# Patient Record
Sex: MALE | Race: WHITE | NOT HISPANIC OR LATINO | Employment: UNEMPLOYED | URBAN - METROPOLITAN AREA
[De-identification: names, ages, dates, MRNs, and addresses within clinical notes are randomized per-mention and may not be internally consistent; named-entity substitution may affect disease eponyms.]

---

## 2017-01-20 ENCOUNTER — TELEPHONE (OUTPATIENT)
Dept: INTERNAL MEDICINE | Facility: CLINIC | Age: 37
End: 2017-01-20

## 2017-01-20 NOTE — TELEPHONE ENCOUNTER
----- Message from Ny Weller sent at 1/20/2017  1:05 PM EST -----  Contact: [phone]  INA MEEHANRAND CALLING FOR HER  DALE HERRERA WHO WENT TO [facility] ER YESTERDAY AND WAS DIAGNOSED WITH ACUTE PNA. HE WAS GIVEN A ZPAK AND HAS STARTED VOMITING TODAY. SHE SAID ITS YELLOW AND LOOKS LIKE INFECTION. SHE WANTS TO KNOW IF THIS IS NORMAL OR IF SHE NEEDS TO HAVE HIM SEEN. SHE CAN BE REACHED [phone]

## 2017-01-20 NOTE — TELEPHONE ENCOUNTER
LVM with Marisol and informed her that pt will need to schedule an appt.and that she will need to call and schedule this soon. Office # given. LVM on pt's # and informed him that he needs to contact the office and make an appt. Office # also given to him.

## 2017-02-06 ENCOUNTER — TELEPHONE (OUTPATIENT)
Dept: INTERNAL MEDICINE | Facility: CLINIC | Age: 37
End: 2017-02-06

## 2017-02-06 RX ORDER — LISINOPRIL 5 MG/1
5 TABLET ORAL DAILY
Qty: 30 TABLET | Refills: 0 | Status: SHIPPED | OUTPATIENT
Start: 2017-02-06 | End: 2017-03-21 | Stop reason: SDUPTHER

## 2017-02-06 RX ORDER — LISINOPRIL 5 MG/1
TABLET ORAL
Refills: 0 | COMMUNITY
Start: 2016-12-16 | End: 2017-02-06 | Stop reason: SDUPTHER

## 2017-02-06 NOTE — TELEPHONE ENCOUNTER
----- Message from Margie Apodaca sent at 2/6/2017  9:07 AM EST -----  Contact: self  Pt called wanting to know is he could have refills on Rx lisinopril to hold him over until he comes into the office in March. Is this okay?  He can be reached at [phone]    Pharmacy- [facility]

## 2017-03-14 RX ORDER — LISINOPRIL 5 MG/1
TABLET ORAL
Qty: 30 TABLET | Refills: 0 | OUTPATIENT
Start: 2017-03-14

## 2017-03-17 RX ORDER — LISINOPRIL 5 MG/1
TABLET ORAL
Qty: 30 TABLET | Refills: 0 | OUTPATIENT
Start: 2017-03-17

## 2017-03-17 RX ORDER — LISINOPRIL 5 MG/1
5 TABLET ORAL DAILY
Qty: 30 TABLET | Refills: 0 | OUTPATIENT
Start: 2017-03-17

## 2017-03-21 ENCOUNTER — TELEPHONE (OUTPATIENT)
Dept: INTERNAL MEDICINE | Facility: CLINIC | Age: 37
End: 2017-03-21

## 2017-03-21 RX ORDER — LISINOPRIL 5 MG/1
5 TABLET ORAL DAILY
Qty: 30 TABLET | Refills: 0 | Status: SHIPPED | OUTPATIENT
Start: 2017-03-21 | End: 2017-04-10 | Stop reason: SDUPTHER

## 2017-03-21 NOTE — TELEPHONE ENCOUNTER
----- Message from yN Weller sent at 3/21/2017  1:56 PM EDT -----  Contact: INA-WIFE  INA HERRERA CALLING FOR HER  DALE HERRERA WHO IS NEEDING A REFILL FOR HIS LISINOPRIL BUT HE WAS TOLD IT WAS DENIED. HE TOOK HIS LAST PILL TODAY AND WANTS TO KNOW WHAT HE SHOULD DO. SHE CAN BE REACHED [phone]

## 2017-03-21 NOTE — TELEPHONE ENCOUNTER
Informed pt's wife that pt needed an appt to be able to receive more refills. She stated he has an appt on 3/23, I told her i would send in a months worth but to make sure he keeps his appt and be seen on that day.

## 2017-03-23 ENCOUNTER — OFFICE VISIT (OUTPATIENT)
Dept: INTERNAL MEDICINE | Facility: CLINIC | Age: 37
End: 2017-03-23

## 2017-03-23 VITALS
SYSTOLIC BLOOD PRESSURE: 108 MMHG | DIASTOLIC BLOOD PRESSURE: 72 MMHG | BODY MASS INDEX: 46.86 KG/M2 | TEMPERATURE: 98.6 F | HEART RATE: 84 BPM | RESPIRATION RATE: 16 BRPM | WEIGHT: 273 LBS

## 2017-03-23 DIAGNOSIS — M62.830 SPASM OF BACK MUSCLES: ICD-10-CM

## 2017-03-23 DIAGNOSIS — Z23 IMMUNIZATION DUE: ICD-10-CM

## 2017-03-23 DIAGNOSIS — F17.200 TOBACCO DEPENDENCE: ICD-10-CM

## 2017-03-23 DIAGNOSIS — M54.2 NECK PAIN: ICD-10-CM

## 2017-03-23 DIAGNOSIS — R53.83 FATIGUE, UNSPECIFIED TYPE: ICD-10-CM

## 2017-03-23 DIAGNOSIS — Z71.6 TOBACCO ABUSE COUNSELING: Primary | ICD-10-CM

## 2017-03-23 DIAGNOSIS — R31.9 HEMATURIA: ICD-10-CM

## 2017-03-23 DIAGNOSIS — E66.9 OBESITY, UNSPECIFIED OBESITY SEVERITY, UNSPECIFIED OBESITY TYPE: ICD-10-CM

## 2017-03-23 DIAGNOSIS — I10 ESSENTIAL HYPERTENSION: ICD-10-CM

## 2017-03-23 LAB
ALBUMIN SERPL-MCNC: 4.8 G/DL (ref 3.2–4.8)
ALBUMIN/GLOB SERPL: 1.3 G/DL (ref 1.5–2.5)
ALP SERPL-CCNC: 90 U/L (ref 25–100)
ALT SERPL W P-5'-P-CCNC: 38 U/L (ref 7–40)
ANION GAP SERPL CALCULATED.3IONS-SCNC: 4 MMOL/L (ref 3–11)
ARTICHOKE IGE QN: 136 MG/DL (ref 0–130)
AST SERPL-CCNC: 48 U/L (ref 0–33)
BACTERIA UR QL AUTO: ABNORMAL /HPF
BASOPHILS # BLD AUTO: 0.09 10*3/MM3 (ref 0–0.2)
BASOPHILS NFR BLD AUTO: 1 % (ref 0–1)
BILIRUB BLD-MCNC: NEGATIVE MG/DL
BILIRUB SERPL-MCNC: 0.6 MG/DL (ref 0.3–1.2)
BUN BLD-MCNC: 17 MG/DL (ref 9–23)
BUN/CREAT SERPL: 17 (ref 7–25)
CALCIUM SPEC-SCNC: 10.3 MG/DL (ref 8.7–10.4)
CHLORIDE SERPL-SCNC: 99 MMOL/L (ref 99–109)
CHOLEST SERPL-MCNC: 175 MG/DL (ref 0–200)
CLARITY, POC: CLEAR
CO2 SERPL-SCNC: 36 MMOL/L (ref 20–31)
COLOR UR: YELLOW
CREAT BLD-MCNC: 1 MG/DL (ref 0.6–1.3)
DEPRECATED RDW RBC AUTO: 46.8 FL (ref 37–54)
EOSINOPHIL # BLD AUTO: 0.02 10*3/MM3 (ref 0.1–0.3)
EOSINOPHIL NFR BLD AUTO: 0.2 % (ref 0–3)
ERYTHROCYTE [DISTWIDTH] IN BLOOD BY AUTOMATED COUNT: 14.5 % (ref 11.3–14.5)
EXPIRATION DATE: ABNORMAL
GFR SERPL CREATININE-BSD FRML MDRD: 84 ML/MIN/1.73
GLOBULIN UR ELPH-MCNC: 3.6 GM/DL
GLUCOSE BLD-MCNC: 96 MG/DL (ref 70–100)
GLUCOSE UR STRIP-MCNC: NEGATIVE MG/DL
HCT VFR BLD AUTO: 53.4 % (ref 38.9–50.9)
HDLC SERPL-MCNC: 26 MG/DL (ref 40–60)
HGB BLD-MCNC: 17.1 G/DL (ref 13.1–17.5)
HYALINE CASTS UR QL AUTO: ABNORMAL /LPF
IMM GRANULOCYTES # BLD: 0.02 10*3/MM3 (ref 0–0.03)
IMM GRANULOCYTES NFR BLD: 0.2 % (ref 0–0.6)
KETONES UR QL: NEGATIVE
LEUKOCYTE EST, POC: NEGATIVE
LYMPHOCYTES # BLD AUTO: 3.01 10*3/MM3 (ref 0.6–4.8)
LYMPHOCYTES NFR BLD AUTO: 33.4 % (ref 24–44)
Lab: ABNORMAL
MCH RBC QN AUTO: 28.3 PG (ref 27–31)
MCHC RBC AUTO-ENTMCNC: 32 G/DL (ref 32–36)
MCV RBC AUTO: 88.4 FL (ref 80–99)
MONOCYTES # BLD AUTO: 1.7 10*3/MM3 (ref 0–1)
MONOCYTES NFR BLD AUTO: 18.8 % (ref 0–12)
NEUTROPHILS # BLD AUTO: 4.18 10*3/MM3 (ref 1.5–8.3)
NEUTROPHILS NFR BLD AUTO: 46.4 % (ref 41–71)
NITRITE UR-MCNC: NEGATIVE MG/ML
PH UR: 6 [PH] (ref 5–8)
PLATELET # BLD AUTO: 189 10*3/MM3 (ref 150–450)
PMV BLD AUTO: 10.2 FL (ref 6–12)
POTASSIUM BLD-SCNC: 4.7 MMOL/L (ref 3.5–5.5)
PROT SERPL-MCNC: 8.4 G/DL (ref 5.7–8.2)
PROT UR STRIP-MCNC: ABNORMAL MG/DL
RBC # BLD AUTO: 6.04 10*6/MM3 (ref 4.2–5.76)
RBC # UR STRIP: ABNORMAL /UL
RBC # UR: ABNORMAL /HPF
REF LAB TEST METHOD: ABNORMAL
SODIUM BLD-SCNC: 139 MMOL/L (ref 132–146)
SP GR UR: 1.01 (ref 1–1.03)
SQUAMOUS #/AREA URNS HPF: ABNORMAL /HPF
TRIGL SERPL-MCNC: 155 MG/DL (ref 0–150)
TSH SERPL DL<=0.05 MIU/L-ACNC: 1.52 MIU/ML (ref 0.35–5.35)
UROBILINOGEN UR QL: NORMAL
WBC NRBC COR # BLD: 9.02 10*3/MM3 (ref 3.5–10.8)
WBC UR QL AUTO: ABNORMAL /HPF

## 2017-03-23 PROCEDURE — 81003 URINALYSIS AUTO W/O SCOPE: CPT | Performed by: NURSE PRACTITIONER

## 2017-03-23 PROCEDURE — 99406 BEHAV CHNG SMOKING 3-10 MIN: CPT | Performed by: NURSE PRACTITIONER

## 2017-03-23 PROCEDURE — 80050 GENERAL HEALTH PANEL: CPT | Performed by: NURSE PRACTITIONER

## 2017-03-23 PROCEDURE — 80061 LIPID PANEL: CPT | Performed by: NURSE PRACTITIONER

## 2017-03-23 PROCEDURE — 36415 COLL VENOUS BLD VENIPUNCTURE: CPT | Performed by: NURSE PRACTITIONER

## 2017-03-23 PROCEDURE — 99214 OFFICE O/P EST MOD 30 MIN: CPT | Performed by: NURSE PRACTITIONER

## 2017-03-23 PROCEDURE — 81015 MICROSCOPIC EXAM OF URINE: CPT | Performed by: NURSE PRACTITIONER

## 2017-03-23 RX ORDER — GABAPENTIN 300 MG/1
300 CAPSULE ORAL 3 TIMES DAILY
Refills: 0 | COMMUNITY
Start: 2017-03-13 | End: 2017-03-23 | Stop reason: SDUPTHER

## 2017-03-23 RX ORDER — GABAPENTIN 300 MG/1
600 CAPSULE ORAL 3 TIMES DAILY
Qty: 180 CAPSULE | Refills: 0 | Status: SHIPPED | OUTPATIENT
Start: 2017-03-23 | End: 2017-05-15 | Stop reason: SDUPTHER

## 2017-03-23 NOTE — PATIENT INSTRUCTIONS
Steps to Quit Smoking   Smoking tobacco can be harmful to your health and can affect almost every organ in your body. Smoking puts you, and those around you, at risk for developing many serious chronic diseases. Quitting smoking is difficult, but it is one of the best things that you can do for your health. It is never too late to quit.  WHAT ARE THE BENEFITS OF QUITTING SMOKING?  When you quit smoking, you lower your risk of developing serious diseases and conditions, such as:  · Lung cancer or lung disease, such as COPD.  · Heart disease.  · Stroke.  · Heart attack.  · Infertility.  · Osteoporosis and bone fractures.  Additionally, symptoms such as coughing, wheezing, and shortness of breath may get better when you quit. You may also find that you get sick less often because your body is stronger at fighting off colds and infections. If you are pregnant, quitting smoking can help to reduce your chances of having a baby of low birth weight.  HOW DO I GET READY TO QUIT?  When you decide to quit smoking, create a plan to make sure that you are successful. Before you quit:  · Pick a date to quit. Set a date within the next two weeks to give you time to prepare.  · Write down the reasons why you are quitting. Keep this list in places where you will see it often, such as on your bathroom mirror or in your car or wallet.  · Identify the people, places, things, and activities that make you want to smoke (triggers) and avoid them. Make sure to take these actions:    Throw away all cigarettes at home, at work, and in your car.    Throw away smoking accessories, such as ashtrays and lighters.    Clean your car and make sure to empty the ashtray.    Clean your home, including curtains and carpets.  · Tell your family, friends, and coworkers that you are quitting. Support from your loved ones can make quitting easier.  · Talk with your health care provider about your options for quitting smoking.  · Find out what treatment  "options are covered by your health insurance.  WHAT STRATEGIES CAN I USE TO QUIT SMOKING?   Talk with your healthcare provider about different strategies to quit smoking. Some strategies include:  · Quitting smoking altogether instead of gradually lessening how much you smoke over a period of time. Research shows that quitting \"cold turkey\" is more successful than gradually quitting.  · Attending in-person counseling to help you build problem-solving skills. You are more likely to have success in quitting if you attend several counseling sessions. Even short sessions of 10 minutes can be effective.  · Finding resources and support systems that can help you to quit smoking and remain smoke-free after you quit. These resources are most helpful when you use them often. They can include:    Online chats with a counselor.    Telephone quitlines.    Printed self-help materials.    Support groups or group counseling.    Text messaging programs.    Mobile phone applications.  · Taking medicines to help you quit smoking. (If you are pregnant or breastfeeding, talk with your health care provider first.) Some medicines contain nicotine and some do not. Both types of medicines help with cravings, but the medicines that include nicotine help to relieve withdrawal symptoms. Your health care provider may recommend:    Nicotine patches, gum, or lozenges.    Nicotine inhalers or sprays.    Non-nicotine medicine that is taken by mouth.  Talk with your health care provider about combining strategies, such as taking medicines while you are also receiving in-person counseling. Using these two strategies together makes you more likely to succeed in quitting than if you used either strategy on its own.  If you are pregnant or breastfeeding, talk with your health care provider about finding counseling or other support strategies to quit smoking. Do not take medicine to help you quit smoking unless told to do so by your health care " provider.  WHAT THINGS CAN I DO TO MAKE IT EASIER TO QUIT?  Quitting smoking might feel overwhelming at first, but there is a lot that you can do to make it easier. Take these important actions:  · Reach out to your family and friends and ask that they support and encourage you during this time. Call telephone quitlines, reach out to support groups, or work with a counselor for support.  · Ask people who smoke to avoid smoking around you.  · Avoid places that trigger you to smoke, such as bars, parties, or smoke-break areas at work.  · Spend time around people who do not smoke.  · Lessen stress in your life, because stress can be a smoking trigger for some people. To lessen stress, try:    Exercising regularly.    Deep-breathing exercises.    Yoga.    Meditating.    Performing a body scan. This involves closing your eyes, scanning your body from head to toe, and noticing which parts of your body are particularly tense. Purposefully relax the muscles in those areas.  · Download or purchase mobile phone or tablet apps (applications) that can help you stick to your quit plan by providing reminders, tips, and encouragement. There are many free apps, such as QuitGuide from the CDC (Centers for Disease Control and Prevention). You can find other support for quitting smoking (smoking cessation) through smokefree.gov and other websites.  HOW WILL I FEEL WHEN I QUIT SMOKING?  Within the first 24 hours of quitting smoking, you may start to feel some withdrawal symptoms. These symptoms are usually most noticeable 2-3 days after quitting, but they usually do not last beyond 2-3 weeks. Changes or symptoms that you might experience include:  · Mood swings.  · Restlessness, anxiety, or irritation.  · Difficulty concentrating.  · Dizziness.  · Strong cravings for sugary foods in addition to nicotine.  · Mild weight gain.  · Constipation.  · Nausea.  · Coughing or a sore throat.  · Changes in how your medicines work in your  body.  · A depressed mood.  · Difficulty sleeping (insomnia).  After the first 2-3 weeks of quitting, you may start to notice more positive results, such as:  · Improved sense of smell and taste.  · Decreased coughing and sore throat.  · Slower heart rate.  · Lower blood pressure.  · Clearer skin.  · The ability to breathe more easily.  · Fewer sick days.  Quitting smoking is very challenging for most people. Do not get discouraged if you are not successful the first time. Some people need to make many attempts to quit before they achieve long-term success. Do your best to stick to your quit plan, and talk with your health care provider if you have any questions or concerns.     This information is not intended to replace advice given to you by your health care provider. Make sure you discuss any questions you have with your health care provider.     Document Released: 12/12/2002 Document Revised: 05/03/2016 Document Reviewed: 05/03/2016  [employer] Interactive Patient Education ©2016 [employer].    MyPlate from [employer]  The general, healthful diet is based on the 2010 Dietary Guidelines for Americans. The amount of food you need to eat from each food group depends on your age, sex, and level of physical activity and can be individualized by a dietitian. Go to ChooseMyPlate.gov for more information.  WHAT DO I NEED TO KNOW ABOUT THE MYPLATE PLAN?  · Enjoy your food, but eat less.    · Avoid oversized portions.      ½ of your plate should include fruits and vegetables.    ¼ of your plate should be grains.    ¼ of your plate should be protein.  Grains  · Make at least half of your grains whole grains.  · For a 2,000 calorie daily food plan, eat 6 oz every day.  · 1 oz is about 1 slice bread, 1 cup cereal, or ½ cup cooked rice, cereal, or pasta.  Vegetables  · Make half your plate fruits and vegetables.  · For a 2,000 calorie daily food plan, eat 2½ cups every day.  · 1 cup is about 1 cup raw or cooked vegetables or  vegetable juice or 2 cups raw leafy greens.  Fruits  · Make half your plate fruits and vegetables.  · For a 2,000 calorie daily food plan, eat 2 cups every day.  · 1 cup is about 1 cup fruit or 100% fruit juice or ½ cup dried fruit.  Protein  · For a 2,000 calorie daily food plan, eat 5½ oz every day.  · 1 oz is about 1 oz meat, poultry, or fish, ¼ cup cooked beans, 1 egg, 1 Tbsp peanut butter, or ½ oz nuts or seeds.  Dairy  · Switch to fat-free or low-fat (1%) milk.  · For a 2,000 calorie daily food plan, eat 3 cups every day.  · 1 cup is about 1 cup milk or yogurt or soy milk (soy beverage), 1½ oz natural cheese, or 2 oz processed cheese.  Fats, Oils, and Empty Calories  · Only small amounts of oils are recommended.  · Empty calories are calories from solid fats or added sugars.  · Compare sodium in foods like soup, bread, and frozen meals. Choose the foods with lower numbers.  · Drink water instead of sugary drinks.  WHAT FOODS CAN I EAT?  Grains  Whole grains such as whole wheat, quinoa, millet, and bulgur. Bread, rolls, and pasta made from whole grains. Brown or wild rice. Hot or cold cereals made from whole grains and without added sugar.  Vegetables  All fresh vegetables, especially fresh red, dark green, or orange vegetables. Peas and beans. Low-sodium frozen or canned vegetables prepared without added salt. Low-sodium vegetable juices.  Fruits  All fresh, frozen, and dried fruits. Canned fruit packed in water or fruit juice without added sugar. Fruit juices without added sugar.  Meats and Other Protein Sources  Boiled, baked, or grilled lean meat trimmed of fat. Skinless poultry. Fresh seafood and shellfish. Canned seafood packed in water. Unsalted nuts and unsalted nut butters. Tofu. Dried beans and pea. Eggs.  Dairy  Low-fat or fat-free milk, yogurt, and cheeses.   Sweets and Desserts  Frozen desserts made from low-fat milk.  Fats and Oils  Olive, peanut, and canola oils and margarine. Salad dressing and  mayonnaise made from these oils.  Other  Soups and casseroles made from allowed ingredients and without added fat or salt.  The items listed above may not be a complete list of recommended foods or beverages. Contact your dietitian for more options.   WHAT FOODS ARE NOT RECOMMENDED?  Grains  Sweetened, low-fiber cereals. Packaged baked goods. Snack crackers and chips. Cheese crackers, butter crackers, and biscuits. Frozen waffles, sweet breads, doughnuts, pastries, packaged baking mixes, pancakes, cakes, and cookies.  Vegetables  Regular canned or frozen vegetables or vegetables prepared with salt. Canned tomatoes. Canned tomato sauce. Fried vegetables. Vegetables in cream sauce or cheese sauce.  Fruits  Fruits packed in syrup or made with added sugar.   Meats and Other Protein Sources  Marbled or fatty meats such as ribs. Poultry with skin. Fried meats, poultry, eggs, or fish. Sausages, hot dogs, and deli meats such as pastrami, bologna, or salami.  Dairy  Whole milk, cream, cheeses made from whole milk, sour cream. Ice cream or yogurt made from whole milk or with added sugar.  Beverages  For adults, no more than one alcoholic drink per day. Regular soft drinks or other sugary beverages. Juice drinks.  Sweets and Desserts  Sugary or fatty desserts, candy, and other sweets.  Fats and Oils  Solid shortening or partially hydrogenated oils. Solid margarine. Margarine that contains trans fats. Butter.  The items listed above may not be a complete list of foods and beverages to avoid. Contact your dietitian for more information.     This information is not intended to replace advice given to you by your health care provider. Make sure you discuss any questions you have with your health care provider.     Document Released: 01/06/2009 Document Revised: 01/08/2016 Document Reviewed: 11/26/2014  [employer] Interactive Patient Education ©2016 [employer]
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
## 2017-04-10 ENCOUNTER — OFFICE VISIT (OUTPATIENT)
Dept: INTERNAL MEDICINE | Facility: CLINIC | Age: 37
End: 2017-04-10

## 2017-04-10 VITALS
BODY MASS INDEX: 45.83 KG/M2 | SYSTOLIC BLOOD PRESSURE: 160 MMHG | HEART RATE: 88 BPM | RESPIRATION RATE: 16 BRPM | DIASTOLIC BLOOD PRESSURE: 90 MMHG | TEMPERATURE: 98 F | WEIGHT: 267 LBS

## 2017-04-10 DIAGNOSIS — I10 ESSENTIAL HYPERTENSION: Primary | ICD-10-CM

## 2017-04-10 PROCEDURE — 99214 OFFICE O/P EST MOD 30 MIN: CPT | Performed by: NURSE PRACTITIONER

## 2017-04-10 RX ORDER — LISINOPRIL 5 MG/1
10 TABLET ORAL DAILY
Qty: 30 TABLET | Refills: 3 | Status: SHIPPED | OUTPATIENT
Start: 2017-04-10

## 2017-04-10 NOTE — PATIENT INSTRUCTIONS
Hematuria, Adult  Hematuria is blood in your urine. It can be caused by a bladder infection, kidney infection, prostate infection, kidney stone, or cancer of your urinary tract. Infections can usually be treated with medicine, and a kidney stone usually will pass through your urine. If neither of these is the cause of your hematuria, further workup to find out the reason may be needed.  It is very important that you tell your health care provider about any blood you see in your urine, even if the blood stops without treatment or happens without causing pain. Blood in your urine that happens and then stops and then happens again can be a symptom of a very serious condition. Also, pain is not a symptom in the initial stages of many urinary cancers.  HOME CARE INSTRUCTIONS   · Drink lots of fluid, 3-4 quarts a day. If you have been diagnosed with an infection, cranberry juice is especially recommended, in addition to large amounts of water.  · Avoid caffeine, tea, and carbonated beverages because they tend to irritate the bladder.  · Avoid alcohol because it may irritate the prostate.  · Take all medicines as directed by your health care provider.  · If you were prescribed an antibiotic medicine, finish it all even if you start to feel better.  · If you have been diagnosed with a kidney stone, follow your health care provider's instructions regarding straining your urine to catch the stone.  · Empty your bladder often. Avoid holding urine for long periods of time.  · After a bowel movement, women should cleanse front to back. Use each tissue only once.  · Empty your bladder before and after sexual intercourse if you are a female.  SEEK MEDICAL CARE IF:  · You develop back pain.  · You have a fever.  · You have a feeling of sickness in your stomach (nausea) or vomiting.  · Your symptoms are not better in 3 days. Return sooner if you are getting worse.  SEEK IMMEDIATE MEDICAL CARE IF:   · You develop severe vomiting and  are unable to keep the medicine down.  · You develop severe back or abdominal pain despite taking your medicines.  · You begin passing a large amount of blood or clots in your urine.  · You feel extremely weak or faint, or you pass out.  MAKE SURE YOU:   · Understand these instructions.  · Will watch your condition.  · Will get help right away if you are not doing well or get worse.     This information is not intended to replace advice given to you by your health care provider. Make sure you discuss any questions you have with your health care provider.     Document Released: 12/18/2006 Document Revised: 01/08/2016 Document Reviewed: 08/18/2014  [employer] Interactive Patient Education ©2016 [employer].

## 2017-04-10 NOTE — PROGRESS NOTES
Chief Complaint   Patient presents with   • Hyperlipidemia   • Blood in Urine        Subjective     History of Present Illness     The patient is here today to f/u on hematuria.  He is also concerned that he has back pain and needs to have medication to control his pain.  His last urine drug screen did show positive and was reviewed for medications that he does not get prescribed.  He was negative for medications that were prescribed.  At his last visit he came to clinic requesting increase in Neurontin due to his pain however he had negative results for Neurontin in his urine drug screen.  He reports to me today that was absolutely impossible.  He was set up in the past by his PCP for pain management however he did not keep that appointment.  He reports he was unable to get their due to needing transportation.  His also here today to request further information about the hematuria that was noted in his last urinalysis.  Denies any weight loss past history of hematuria or workup for hematuria.  He denies any urinary frequency hesitancy or dysuria.    The following portions of the patient's history were reviewed and updated as appropriate: allergies, current medications, past family history, past medical history, past social history, past surgical history and problem list.    Review of Systems   Constitutional: Negative for fever.   Respiratory: Negative for cough.    Cardiovascular: Positive for chest pain.   Gastrointestinal: Negative for abdominal pain.   Genitourinary: Negative for decreased urine volume, dysuria, hematuria and urgency.   All other systems reviewed and are negative.      Objective   Physical Exam   Constitutional: He is oriented to person, place, and time. He appears well-developed and well-nourished.   HENT:   Head: Normocephalic and atraumatic.   Right Ear: External ear normal.   Left Ear: External ear normal.   Nose: Nose normal.   Mouth/Throat: Oropharynx is clear and moist.   Eyes:  Conjunctivae are normal. No scleral icterus.   Neck: Normal range of motion. Neck supple.   Cardiovascular: Normal rate and regular rhythm.    Pulmonary/Chest: Effort normal and breath sounds normal.   Abdominal: Soft. Bowel sounds are normal. He exhibits no distension and no mass. There is no tenderness. There is no rebound and no guarding. No hernia.   Musculoskeletal: Normal range of motion.   Neurological: He is alert and oriented to person, place, and time.   Skin: Skin is warm and dry.   Psychiatric: He has a normal mood and affect. His behavior is normal.   Nursing note and vitals reviewed.      Results for orders placed or performed in visit on 03/23/17   Lipid Panel   Result Value Ref Range    Total Cholesterol 175 0 - 200 mg/dL    Triglycerides 155 (H) 0 - 150 mg/dL    HDL Cholesterol 26 (L) 40 - 60 mg/dL    LDL Cholesterol  136 (H) 0 - 130 mg/dL   Comprehensive Metabolic Panel   Result Value Ref Range    Glucose 96 70 - 100 mg/dL    BUN 17 9 - 23 mg/dL    Creatinine 1.00 0.60 - 1.30 mg/dL    Sodium 139 132 - 146 mmol/L    Potassium 4.7 3.5 - 5.5 mmol/L    Chloride 99 99 - 109 mmol/L    CO2 36.0 (H) 20.0 - 31.0 mmol/L    Calcium 10.3 8.7 - 10.4 mg/dL    Total Protein 8.4 (H) 5.7 - 8.2 g/dL    Albumin 4.80 3.20 - 4.80 g/dL    ALT (SGPT) 38 7 - 40 U/L    AST (SGOT) 48 (H) 0 - 33 U/L    Alkaline Phosphatase 90 25 - 100 U/L    Total Bilirubin 0.6 0.3 - 1.2 mg/dL    eGFR Non African Amer 84 >60 mL/min/1.73    Globulin 3.6 gm/dL    A/G Ratio 1.3 (L) 1.5 - 2.5 g/dL    BUN/Creatinine Ratio 17.0 7.0 - 25.0    Anion Gap 4.0 3.0 - 11.0 mmol/L   TSH   Result Value Ref Range    TSH 1.520 0.350 - 5.350 mIU/mL   CBC Auto Differential   Result Value Ref Range    WBC 9.02 3.50 - 10.80 10*3/mm3    RBC 6.04 (H) 4.20 - 5.76 10*6/mm3    Hemoglobin 17.1 13.1 - 17.5 g/dL    Hematocrit 53.4 (H) 38.9 - 50.9 %    MCV 88.4 80.0 - 99.0 fL    MCH 28.3 27.0 - 31.0 pg    MCHC 32.0 32.0 - 36.0 g/dL    RDW 14.5 11.3 - 14.5 %    RDW-SD  46.8 37.0 - 54.0 fl    MPV 10.2 6.0 - 12.0 fL    Platelets 189 150 - 450 10*3/mm3    Neutrophil % 46.4 41.0 - 71.0 %    Lymphocyte % 33.4 24.0 - 44.0 %    Monocyte % 18.8 (H) 0.0 - 12.0 %    Eosinophil % 0.2 0.0 - 3.0 %    Basophil % 1.0 0.0 - 1.0 %    Immature Grans % 0.2 0.0 - 0.6 %    Neutrophils, Absolute 4.18 1.50 - 8.30 10*3/mm3    Lymphocytes, Absolute 3.01 0.60 - 4.80 10*3/mm3    Monocytes, Absolute 1.70 (H) 0.00 - 1.00 10*3/mm3    Eosinophils, Absolute 0.02 (L) 0.10 - 0.30 10*3/mm3    Basophils, Absolute 0.09 0.00 - 0.20 10*3/mm3    Immature Grans, Absolute 0.02 0.00 - 0.03 10*3/mm3   Urinalysis, Microscopic Only   Result Value Ref Range    RBC, UA 3-6 (A) None Seen, 0-2 /HPF    WBC, UA 0-2 (A) None Seen /HPF    Bacteria, UA None Seen None Seen, Trace /HPF    Squamous Epithelial Cells, UA 0-2 None Seen, 0-2 /HPF    Hyaline Casts, UA 0-6 0 - 6 /LPF    Methodology Automated Microscopy    POC Urinalysis Dipstick, Automated   Result Value Ref Range    Color Yellow Yellow, Straw, Dark Yellow, Marita    Clarity, UA Clear Clear    Glucose, UA Negative Negative, 1000 mg/dL (3+) mg/dL    Bilirubin Negative Negative    Ketones, UA Negative Negative    Specific Gravity  1.015 1.005 - 1.030    Blood, UA 50 Oli/ul (A) Negative    pH, Urine 6.0 5.0 - 8.0    Protein, POC Trace (A) Negative mg/dL    Urobilinogen, UA Normal Normal    Leukocytes Negative Negative    Nitrite, UA Negative Negative    Lot Number [id number]     Expiration Date 2-28-18         Assessment/Plan   Nabil was seen today for hyperlipidemia and blood in urine.    Diagnoses and all orders for this visit:    Essential hypertension  -     lisinopril (PRINIVIL,ZESTRIL) 5 MG tablet; Take 2 tablets by mouth Daily.  -     CT Abdomen Pelvis With & Without Contrast; Future  -     Ambulatory Referral to Urology     hypertension uncontrolled increase lisinopril to 10 mg daily recheck in one month.    Approximately 30 minutes was spent with patient today in regards  to counseling.  Discussions in regards to positive UDS with medications that are not prescribed to him that he reports buying off the street were reviewed.  He reports his periods to care for obtaining pain medications as he cannot find a ride to pain management.  He declines working due to trying to obtain disability at this point.  I've encouraged him strongly to consider options for income such as a job that he could maintain that would not affect his back pain.  I discussed with him that at this time cannot prescribe any medication for him when he is buying illicit drugs.  The patient reports he will take care when if he needs to to control his pain.  I have strongly advised patient against any illicit drug use and to consider counseling as well as pain management rehabilitation is options.  The patient verbalizes understanding.    Follow up 1mo  RTC/call  If symptoms worsen  Meds MOA and SE's reviewed and pt v/u

## 2017-05-15 DIAGNOSIS — M62.830 SPASM OF BACK MUSCLES: ICD-10-CM

## 2017-05-15 DIAGNOSIS — M54.2 NECK PAIN: ICD-10-CM

## 2017-05-15 RX ORDER — GABAPENTIN 300 MG/1
600 CAPSULE ORAL 3 TIMES DAILY
Qty: 180 CAPSULE | Refills: 0 | Status: SHIPPED | OUTPATIENT
Start: 2017-05-15